# Patient Record
Sex: MALE | Race: WHITE | NOT HISPANIC OR LATINO | URBAN - METROPOLITAN AREA
[De-identification: names, ages, dates, MRNs, and addresses within clinical notes are randomized per-mention and may not be internally consistent; named-entity substitution may affect disease eponyms.]

---

## 2020-09-24 ENCOUNTER — OFFICE VISIT (OUTPATIENT)
Dept: URBAN - METROPOLITAN AREA CLINIC 23 | Facility: CLINIC | Age: 40
End: 2020-09-24
Payer: MEDICAID

## 2020-09-24 VITALS
TEMPERATURE: 97.7 F | HEIGHT: 68 IN | SYSTOLIC BLOOD PRESSURE: 149 MMHG | HEART RATE: 70 BPM | WEIGHT: 228.8 LBS | BODY MASS INDEX: 34.68 KG/M2 | DIASTOLIC BLOOD PRESSURE: 96 MMHG

## 2020-09-24 DIAGNOSIS — R10.13 EPIGASTRIC PAIN: ICD-10-CM

## 2020-09-24 DIAGNOSIS — K92.1 RECTAL BLEEDING: ICD-10-CM

## 2020-09-24 DIAGNOSIS — R19.7 DIARRHEA: ICD-10-CM

## 2020-09-24 PROCEDURE — 99204 OFFICE O/P NEW MOD 45 MIN: CPT | Performed by: INTERNAL MEDICINE

## 2020-09-24 PROCEDURE — 99244 OFF/OP CNSLTJ NEW/EST MOD 40: CPT | Performed by: INTERNAL MEDICINE

## 2020-09-24 NOTE — HPI-TODAY'S VISIT:
- 41 yo  male of [place] descent, referred by Dr. Warren for further evaluation of GI complaints as detailed below, which have been ongoing for approximately 2 years, but which have worsened over the past 3-6 months.  A copy of this note will be sent to the referring physician. - Worsening heartburn, burning epigastric pain, and dyspepsia.  Has been taking Nexium OTC once a day for over 1 year, with only partial relief of symptoms. - May take 3-4 tablets of ibuprofen for shoulder pains, about twice a month - Intermittent diarrhea and rectal bleeding, which he describes as red blood spotting in the toilet paper - States he has had hemorrhoids.  Occasionally feels a perianal lump, with occasional discomfort. - Denies family history of GI malignancies in any first degree relatives - Complains of a left inguinal hernia "for 8-9 years"

## 2020-09-27 LAB
A/G RATIO: 1.6
ALBUMIN: 4.7
ALKALINE PHOSPHATASE: 83
ALT (SGPT): 16
AST (SGOT): 18
BASO (ABSOLUTE): 0.1
BASOS: 1
BILIRUBIN, TOTAL: 1.1
BUN/CREATININE RATIO: 18
BUN: 18
C-REACTIVE PROTEIN, QUANT: 5
CALCIUM: 10.2
CARBON DIOXIDE, TOTAL: 25
CHLORIDE: 101
CREATININE: 0.99
EGFR IF AFRICN AM: 110
EGFR IF NONAFRICN AM: 96
EOS (ABSOLUTE): 0.3
EOS: 2
GLOBULIN, TOTAL: 2.9
GLUCOSE: 87
HEMATOCRIT: 46.2
HEMATOLOGY COMMENTS:: (no result)
HEMOGLOBIN: 15.2
IMMATURE CELLS: (no result)
IMMATURE GRANS (ABS): 0.1
IMMATURE GRANULOCYTES: 1
IMMUNOGLOBULIN A, QN, SERUM: 272
LIPASE: 25
LYMPHS (ABSOLUTE): 3
LYMPHS: 22
MCH: 30.3
MCHC: 32.9
MCV: 92
MONOCYTES(ABSOLUTE): 1.2
MONOCYTES: 9
NEUTROPHILS (ABSOLUTE): 9.2
NEUTROPHILS: 65
NRBC: (no result)
PLATELETS: 263
POTASSIUM: 4.9
PROTEIN, TOTAL: 7.6
RBC: 5.02
RDW: 11.8
SEDIMENTATION RATE-WESTERGREN: 3
SODIUM: 138
T-TRANSGLUTAMINASE (TTG) IGA: <2
TSH: 2.1
WBC: 13.9

## 2020-10-12 LAB — GASTROINTESTINAL PATHOGEN: (no result)

## 2020-10-28 ENCOUNTER — DASHBOARD ENCOUNTERS (OUTPATIENT)
Age: 40
End: 2020-10-28

## 2020-10-28 ENCOUNTER — OFFICE VISIT (OUTPATIENT)
Dept: URBAN - METROPOLITAN AREA LAB 3 | Facility: LAB | Age: 40
End: 2020-10-28
Payer: MEDICAID

## 2020-10-28 ENCOUNTER — TELEPHONE ENCOUNTER (OUTPATIENT)
Dept: URBAN - METROPOLITAN AREA CLINIC 92 | Facility: CLINIC | Age: 40
End: 2020-10-28

## 2020-10-28 DIAGNOSIS — D12.3 ADENOMA OF TRANSVERSE COLON: ICD-10-CM

## 2020-10-28 DIAGNOSIS — D12.2 ADENOMA OF ASCENDING COLON: ICD-10-CM

## 2020-10-28 DIAGNOSIS — K62.5 ANAL BLEEDING: ICD-10-CM

## 2020-10-28 DIAGNOSIS — K31.89 ACQUIRED DEFORMITY OF DUODENUM: ICD-10-CM

## 2020-10-28 DIAGNOSIS — K31.7 BENIGN GASTRIC POLYP: ICD-10-CM

## 2020-10-28 DIAGNOSIS — D12.8 ADENOMATOUS POLYP OF RECTUM: ICD-10-CM

## 2020-10-28 DIAGNOSIS — R19.7 ACUTE DIARRHEA: ICD-10-CM

## 2020-10-28 DIAGNOSIS — K21.00 CHRONIC REFLUX ESOPHAGITIS: ICD-10-CM

## 2020-10-28 PROCEDURE — 45385 COLONOSCOPY W/LESION REMOVAL: CPT | Performed by: INTERNAL MEDICINE

## 2020-10-28 PROCEDURE — 43239 EGD BIOPSY SINGLE/MULTIPLE: CPT | Performed by: INTERNAL MEDICINE

## 2020-10-28 PROCEDURE — 45380 COLONOSCOPY AND BIOPSY: CPT | Performed by: INTERNAL MEDICINE

## 2020-10-28 PROCEDURE — G9937 DIG OR SURV COLSCO: HCPCS | Performed by: INTERNAL MEDICINE

## 2020-10-28 RX ORDER — CIPROFLOXACIN 500 MG/1
1 TABLET TABLET, FILM COATED ORAL TWICE A DAY
Qty: 10 TABLETS | Refills: 0 | OUTPATIENT
Start: 2020-10-28 | End: 2020-11-01
